# Patient Record
Sex: MALE | Race: WHITE | Employment: FULL TIME | URBAN - METROPOLITAN AREA
[De-identification: names, ages, dates, MRNs, and addresses within clinical notes are randomized per-mention and may not be internally consistent; named-entity substitution may affect disease eponyms.]

---

## 2021-08-08 ENCOUNTER — APPOINTMENT (OUTPATIENT)
Dept: CT IMAGING | Age: 61
End: 2021-08-08
Payer: COMMERCIAL

## 2021-08-08 ENCOUNTER — HOSPITAL ENCOUNTER (EMERGENCY)
Age: 61
Discharge: HOME | End: 2021-08-08
Attending: EMERGENCY MEDICINE
Payer: COMMERCIAL

## 2021-08-08 VITALS
HEART RATE: 70 BPM | WEIGHT: 235 LBS | OXYGEN SATURATION: 99 % | BODY MASS INDEX: 28.62 KG/M2 | SYSTOLIC BLOOD PRESSURE: 152 MMHG | HEIGHT: 76 IN | RESPIRATION RATE: 12 BRPM | TEMPERATURE: 97.8 F | DIASTOLIC BLOOD PRESSURE: 97 MMHG

## 2021-08-08 DIAGNOSIS — K52.9 ENTERITIS: ICD-10-CM

## 2021-08-08 DIAGNOSIS — N20.1 URETEROLITHIASIS: Primary | ICD-10-CM

## 2021-08-08 DIAGNOSIS — N13.2 HYDRONEPHROSIS WITH URINARY OBSTRUCTION DUE TO URETERAL CALCULUS: ICD-10-CM

## 2021-08-08 LAB
-: ABNORMAL
ABSOLUTE EOS #: 0 K/UL (ref 0–0.4)
ABSOLUTE IMMATURE GRANULOCYTE: ABNORMAL K/UL (ref 0–0.3)
ABSOLUTE LYMPH #: 0.8 K/UL (ref 1–4.8)
ABSOLUTE MONO #: 0.9 K/UL (ref 0.1–1.2)
ALBUMIN SERPL-MCNC: 4.4 G/DL (ref 3.5–5.2)
ALBUMIN/GLOBULIN RATIO: 1.7 (ref 1–2.5)
ALP BLD-CCNC: 60 U/L (ref 40–129)
ALT SERPL-CCNC: 26 U/L (ref 5–41)
AMORPHOUS: ABNORMAL
ANION GAP SERPL CALCULATED.3IONS-SCNC: 10 MMOL/L (ref 9–17)
AST SERPL-CCNC: 17 U/L
BACTERIA: ABNORMAL
BASOPHILS # BLD: 0 % (ref 0–2)
BASOPHILS ABSOLUTE: 0 K/UL (ref 0–0.2)
BILIRUB SERPL-MCNC: 0.82 MG/DL (ref 0.3–1.2)
BILIRUBIN URINE: NEGATIVE
BUN BLDV-MCNC: 14 MG/DL (ref 8–23)
BUN/CREAT BLD: ABNORMAL (ref 9–20)
CALCIUM SERPL-MCNC: 9.2 MG/DL (ref 8.6–10.4)
CASTS UA: ABNORMAL /LPF
CHLORIDE BLD-SCNC: 89 MMOL/L (ref 98–107)
CO2: 26 MMOL/L (ref 20–31)
COLOR: YELLOW
COMMENT UA: ABNORMAL
CREAT SERPL-MCNC: 1.04 MG/DL (ref 0.7–1.2)
CRYSTALS, UA: ABNORMAL /HPF
DIFFERENTIAL TYPE: ABNORMAL
EOSINOPHILS RELATIVE PERCENT: 0 % (ref 1–4)
EPITHELIAL CELLS UA: ABNORMAL /HPF (ref 0–5)
GFR AFRICAN AMERICAN: >60 ML/MIN
GFR NON-AFRICAN AMERICAN: >60 ML/MIN
GFR SERPL CREATININE-BSD FRML MDRD: ABNORMAL ML/MIN/{1.73_M2}
GFR SERPL CREATININE-BSD FRML MDRD: ABNORMAL ML/MIN/{1.73_M2}
GLUCOSE BLD-MCNC: 148 MG/DL (ref 70–99)
GLUCOSE URINE: NEGATIVE
HCT VFR BLD CALC: 40.6 % (ref 41–53)
HEMOGLOBIN: 13.6 G/DL (ref 13.5–17.5)
IMMATURE GRANULOCYTES: ABNORMAL %
KETONES, URINE: NEGATIVE
LEUKOCYTE ESTERASE, URINE: ABNORMAL
LIPASE: 18 U/L (ref 13–60)
LYMPHOCYTES # BLD: 8 % (ref 24–44)
MCH RBC QN AUTO: 31 PG (ref 26–34)
MCHC RBC AUTO-ENTMCNC: 33.5 G/DL (ref 31–37)
MCV RBC AUTO: 92.5 FL (ref 80–100)
MONOCYTES # BLD: 9 % (ref 2–11)
MUCUS: ABNORMAL
NITRITE, URINE: NEGATIVE
NRBC AUTOMATED: ABNORMAL PER 100 WBC
OTHER OBSERVATIONS UA: ABNORMAL
PDW BLD-RTO: 12.9 % (ref 12.5–15.4)
PH UA: 6.5 (ref 5–8)
PLATELET # BLD: 193 K/UL (ref 140–450)
PLATELET ESTIMATE: ABNORMAL
PMV BLD AUTO: 8.3 FL (ref 6–12)
POTASSIUM SERPL-SCNC: 3.8 MMOL/L (ref 3.7–5.3)
PROTEIN UA: NEGATIVE
RBC # BLD: 4.39 M/UL (ref 4.5–5.9)
RBC # BLD: ABNORMAL 10*6/UL
RBC UA: ABNORMAL /HPF (ref 0–2)
RENAL EPITHELIAL, UA: ABNORMAL /HPF
SEG NEUTROPHILS: 83 % (ref 36–66)
SEGMENTED NEUTROPHILS ABSOLUTE COUNT: 7.6 K/UL (ref 1.8–7.7)
SODIUM BLD-SCNC: 125 MMOL/L (ref 135–144)
SPECIFIC GRAVITY UA: 1.01 (ref 1–1.03)
TOTAL PROTEIN: 7 G/DL (ref 6.4–8.3)
TRICHOMONAS: ABNORMAL
TURBIDITY: CLEAR
URINE HGB: ABNORMAL
UROBILINOGEN, URINE: NORMAL
WBC # BLD: 9.3 K/UL (ref 3.5–11)
WBC # BLD: ABNORMAL 10*3/UL
WBC UA: ABNORMAL /HPF (ref 0–5)
YEAST: ABNORMAL

## 2021-08-08 PROCEDURE — 6360000002 HC RX W HCPCS: Performed by: PHYSICIAN ASSISTANT

## 2021-08-08 PROCEDURE — 2580000003 HC RX 258: Performed by: PHYSICIAN ASSISTANT

## 2021-08-08 PROCEDURE — 85025 COMPLETE CBC W/AUTO DIFF WBC: CPT

## 2021-08-08 PROCEDURE — 96375 TX/PRO/DX INJ NEW DRUG ADDON: CPT

## 2021-08-08 PROCEDURE — 81001 URINALYSIS AUTO W/SCOPE: CPT

## 2021-08-08 PROCEDURE — 83690 ASSAY OF LIPASE: CPT

## 2021-08-08 PROCEDURE — 36415 COLL VENOUS BLD VENIPUNCTURE: CPT

## 2021-08-08 PROCEDURE — 74177 CT ABD & PELVIS W/CONTRAST: CPT

## 2021-08-08 PROCEDURE — 96374 THER/PROPH/DIAG INJ IV PUSH: CPT

## 2021-08-08 PROCEDURE — 80053 COMPREHEN METABOLIC PANEL: CPT

## 2021-08-08 PROCEDURE — 99284 EMERGENCY DEPT VISIT MOD MDM: CPT

## 2021-08-08 PROCEDURE — 6360000004 HC RX CONTRAST MEDICATION: Performed by: PHYSICIAN ASSISTANT

## 2021-08-08 RX ORDER — 0.9 % SODIUM CHLORIDE 0.9 %
1000 INTRAVENOUS SOLUTION INTRAVENOUS ONCE
Status: COMPLETED | OUTPATIENT
Start: 2021-08-08 | End: 2021-08-08

## 2021-08-08 RX ORDER — LEVOFLOXACIN 750 MG/1
750 TABLET ORAL DAILY
Qty: 5 TABLET | Refills: 0 | Status: SHIPPED | OUTPATIENT
Start: 2021-08-08 | End: 2021-08-08 | Stop reason: SDUPTHER

## 2021-08-08 RX ORDER — SODIUM CHLORIDE 0.9 % (FLUSH) 0.9 %
10 SYRINGE (ML) INJECTION PRN
Status: DISCONTINUED | OUTPATIENT
Start: 2021-08-08 | End: 2021-08-08 | Stop reason: HOSPADM

## 2021-08-08 RX ORDER — LEVOFLOXACIN 750 MG/1
750 TABLET ORAL DAILY
Qty: 5 TABLET | Refills: 0 | Status: SHIPPED | OUTPATIENT
Start: 2021-08-08 | End: 2021-08-08 | Stop reason: CLARIF

## 2021-08-08 RX ORDER — TAMSULOSIN HYDROCHLORIDE 0.4 MG/1
0.4 CAPSULE ORAL DAILY
Qty: 5 CAPSULE | Refills: 0 | Status: ON HOLD | OUTPATIENT
Start: 2021-08-08 | End: 2021-08-12 | Stop reason: SDUPTHER

## 2021-08-08 RX ORDER — ONDANSETRON 4 MG/1
4 TABLET, ORALLY DISINTEGRATING ORAL EVERY 6 HOURS PRN
Qty: 10 TABLET | Refills: 1 | Status: SHIPPED | OUTPATIENT
Start: 2021-08-08 | End: 2021-08-10

## 2021-08-08 RX ORDER — HYDROCODONE BITARTRATE AND ACETAMINOPHEN 5; 325 MG/1; MG/1
1 TABLET ORAL EVERY 6 HOURS PRN
Qty: 10 TABLET | Refills: 0 | Status: SHIPPED | OUTPATIENT
Start: 2021-08-08 | End: 2021-08-09 | Stop reason: SDUPTHER

## 2021-08-08 RX ORDER — 0.9 % SODIUM CHLORIDE 0.9 %
80 INTRAVENOUS SOLUTION INTRAVENOUS ONCE
Status: COMPLETED | OUTPATIENT
Start: 2021-08-08 | End: 2021-08-08

## 2021-08-08 RX ORDER — LEVOFLOXACIN 750 MG/1
750 TABLET ORAL DAILY
Qty: 5 TABLET | Refills: 0 | Status: SHIPPED | OUTPATIENT
Start: 2021-08-08 | End: 2021-08-13

## 2021-08-08 RX ORDER — ONDANSETRON 2 MG/ML
4 INJECTION INTRAMUSCULAR; INTRAVENOUS ONCE
Status: COMPLETED | OUTPATIENT
Start: 2021-08-08 | End: 2021-08-08

## 2021-08-08 RX ORDER — MORPHINE SULFATE 4 MG/ML
4 INJECTION, SOLUTION INTRAMUSCULAR; INTRAVENOUS ONCE
Status: COMPLETED | OUTPATIENT
Start: 2021-08-08 | End: 2021-08-08

## 2021-08-08 RX ADMIN — SODIUM CHLORIDE 1000 ML: 9 INJECTION, SOLUTION INTRAVENOUS at 11:41

## 2021-08-08 RX ADMIN — MORPHINE SULFATE 4 MG: 4 INJECTION INTRAVENOUS at 11:41

## 2021-08-08 RX ADMIN — ONDANSETRON 4 MG: 2 INJECTION INTRAMUSCULAR; INTRAVENOUS at 11:41

## 2021-08-08 RX ADMIN — SODIUM CHLORIDE, PRESERVATIVE FREE 10 ML: 5 INJECTION INTRAVENOUS at 12:03

## 2021-08-08 RX ADMIN — IOPAMIDOL 75 ML: 755 INJECTION, SOLUTION INTRAVENOUS at 12:03

## 2021-08-08 RX ADMIN — SODIUM CHLORIDE 80 ML: 9 INJECTION, SOLUTION INTRAVENOUS at 12:02

## 2021-08-08 ASSESSMENT — PAIN SCALES - GENERAL
PAINLEVEL_OUTOF10: 6
PAINLEVEL_OUTOF10: 5
PAINLEVEL_OUTOF10: 4

## 2021-08-08 ASSESSMENT — PAIN DESCRIPTION - LOCATION
LOCATION: ABDOMEN
LOCATION: FLANK

## 2021-08-08 ASSESSMENT — PAIN DESCRIPTION - PROGRESSION: CLINICAL_PROGRESSION: GRADUALLY IMPROVING

## 2021-08-08 ASSESSMENT — PAIN DESCRIPTION - ORIENTATION: ORIENTATION: MID;LEFT;RIGHT

## 2021-08-08 ASSESSMENT — PAIN DESCRIPTION - DESCRIPTORS: DESCRIPTORS: PRESSURE;ACHING

## 2021-08-08 NOTE — ED NOTES
Patient provided with discharge instructions, prescriptions, and follow up information. Verbalized understanding. IV discontinued and dry dressing in place. A&OX3. Steady gait noted at discharge. Wheelchair declined by patient.       Carleen Simon RN  08/08/21 3216

## 2021-08-08 NOTE — ED PROVIDER NOTES
[address] [facility] ED  [address] [address] [facility] [place] [address]  Phone: [phone]  Fax: [phone] [facility]      Pt Name: Jj Jacome  MRN: [id number]  [place] 1960  Date of evaluation: 8/8/21      CHIEF COMPLAINT:  Chief Complaint   Patient presents with    Abdominal Pain    Constipation       HISTORY OF PRESENT ILLNESS    Jj Jacome is a 62 y.o. male who presents with GI complaints:       Timing/Onset:   3 days  Context/Setting: Patient here with wife for evaluation of persistence of abdominal distention and waves of pressure. Mid to lower abdominal pain that has been going on since Thursday. Patient states he has had very little appetite and has only had 1 bout of vomiting which was on Thursday. Patient has also had no bowel movement since that time either. Patient does feel gassy and looks a little distended. Passing very small amounts of gas he confirms. He is producing urine and drinking pretty well but eating very little. Hx of polypectomies with his previous 3 colonoscopies. Hernia surgeries x 2 with mesh remotely as well. No other urological/abdominal/surgical history reported. No associated chest/resp symptoms. No fever or chills. Quality:    achy  crampy pressure  Duration:  intermittent  Modifying Factors: as above  Severity: moderate        Nursing Notes were reviewed. REVIEW OF SYSTEMS       Constitutional: per HPI  Eyes: No visual changes. Neck: No neck pain. Respiratory: Denies recent shortness of breath. Cardiac:  Denies recent chest pain or palpitations  GI:  Per HPI  : Per HPI    Musculoskeletal: per HPI   Neurologic: Denies headache or focal weakness. Skin:  Denies any rash. Negative in 10 essential Systems except as mentioned above and in the HPI. PAST MEDICAL HISTORY   PMH:  has no past medical history on file.   Surgical History:  has a past surgical history that includes knee surgery and hernia LABS:  Labs Reviewed   CBC WITH AUTO DIFFERENTIAL - Abnormal; Notable for the following components:       Result Value    RBC 4.39 (*)     Hematocrit 40.6 (*)     Seg Neutrophils 83 (*)     Lymphocytes 8 (*)     Eosinophils % 0 (*)     Absolute Lymph # 0.80 (*)     All other components within normal limits   COMPREHENSIVE METABOLIC PANEL W/ REFLEX TO MG FOR LOW K - Abnormal; Notable for the following components:    Glucose 148 (*)     Sodium 125 (*)     Chloride 89 (*)     All other components within normal limits   URINE RT REFLEX TO CULTURE - Abnormal; Notable for the following components:    Urine Hgb LARGE (*)     Leukocyte Esterase, Urine TRACE (*)     All other components within normal limits   MICROSCOPIC URINALYSIS - Abnormal; Notable for the following components:    Bacteria, UA FEW (*)     Other Observations UA   (*)     Value: Utilizing a urinalysis as the only screening method to exclude a potential uropathogen can be unreliable in many patient populations. Rapid screening tests are less sensitive than culture and if UTI is a clinical possibility, culture should be considered despite a negative urinalysis. All other components within normal limits   LIPASE         EMERGENCY DEPARTMENT COURSE, DDX and MDM:     1133  Patient nontoxic and in no pain distress. Full abdominal work-up with CT to rule out obstructive process or any other acute etiology. Morphine and Zofran ordered. 1330   Discussed findings with attending. OK for outpt f/u as labs and clinical findings mild. Pt comfortable going home but I expressed need for close f/u for this large stone. I sent referral via [employer] to [facility] office. I urged them to call them Monday morning for scheduling. Return to ED for worsening symptoms/abd pain/fevers/other concerns. I did provide abx rx. for stranding and mild UTI. I have reviewed the disposition diagnosis with the patient and or their family/guardian.   I have answered their questions and given discharge instructions. They voiced understanding of these instructions and did not have any further questions or complaints. We estimate there is LOW risk for ACUTE APPENDICITIS, BOWEL OBSTRUCTION, CHOLECYSTITIS, DIVERTICULITIS, INCARCERATED HERNIA, PANCREATITIS, or PERFORATED BOWEL or ULCER, thus we consider the discharge disposition reasonable. Re-evaluation of the abdomen is benign. No guarding, peritoneal signs or significant tenderness noted. Also, there is no evidence or peritonitis, sepsis, or toxicity. The patient and/or family and I have discussed the diagnosis and risks, and we agree with discharging home to follow-up with their primary doctor. The patient presents with abdominal pain without signs of peritonitis or other life-threatening or serious etiology. The patient appears stable for discharge and has been instructed to return immediately if the symptoms worsen in any way, or in 8-12 hr if not improved for re-evaluation. We also discussed returning to the Emergency Department immediately if new or worsening symptoms occur. We have discussed the symptoms which are most concerning (e.g., bloody stool, fever, changing or worsening pain, persistent vomiting, chest pain shortness of breath, numbness or weakness to the arms or legs, coolness or color change to the arms or legs) that necessitate immediate return. The patient understands that at this time there is no evidence for a more malignant underlying process, but the patient also understands that early in the process of an illness or injury, an emergency department workup can be falsely reassuring. Routine discharge counseling was given, and the patient understands that worsening, changing or persistent symptoms should prompt an immediate call or follow up with their primary physician or return to the emergency department. The importance of appropriate follow up was also discussed.   We have reviewed the disposition diagnosis with the patient and or their family/guardian. We have answered their questions and given discharge instructions. They voiced understanding of these instructions and did not have any further questions or complaints. Orders Placed This Encounter   Medications    0.9 % sodium chloride bolus    ondansetron (ZOFRAN) injection 4 mg    morphine injection 4 mg    iopamidol (ISOVUE-370) 76 % injection 75 mL    0.9 % sodium chloride bolus    sodium chloride flush 0.9 % injection 10 mL    DISCONTD: levoFLOXacin (LEVAQUIN) 750 MG tablet     Sig: Take 1 tablet by mouth daily for 5 days     Dispense:  5 tablet     Refill:  0    tamsulosin (FLOMAX) 0.4 MG capsule     Sig: Take 1 capsule by mouth daily for 5 doses     Dispense:  5 capsule     Refill:  0    HYDROcodone-acetaminophen (NORCO) 5-325 MG per tablet     Sig: Take 1 tablet by mouth every 6 hours as needed for Pain for up to 3 days. Dispense:  10 tablet     Refill:  0    levoFLOXacin (LEVAQUIN) 750 MG tablet     Sig: Take 1 tablet by mouth daily for 5 days     Dispense:  5 tablet     Refill:  0    ondansetron (ZOFRAN ODT) 4 MG disintegrating tablet     Sig: Take 1 tablet by mouth every 6 hours as needed for Nausea or Vomiting     Dispense:  10 tablet     Refill:  1       CONSULTS:  None      FINAL IMPRESSION      1. Ureterolithiasis    2. Hydronephrosis with urinary obstruction due to ureteral calculus    3. Enteritis          DISPOSITION/PLAN:  DISPOSITION Decision To Discharge 08/08/2021 01:28:19 PM        PATIENT REFERRED TO:  Dhiraj Bingham MD  [facility] [id number]    Schedule an appointment as soon as possible for a visit in 2 days  for re-evaluation of your symptoms    [facility] ED  [address] [facility]   [place] [address] [phone]  Go to   for worsening of symptoms, inability to stay hydrated with vomiting/diarrhea, increasing abdominal pain, fevers, vomiting      DISCHARGE MEDICATIONS:  New Prescriptions    HYDROCODONE-ACETAMINOPHEN (NORCO) 5-325 MG PER TABLET    Take 1 tablet by mouth every 6 hours as needed for Pain for up to 3 days.     LEVOFLOXACIN (LEVAQUIN) 750 MG TABLET    Take 1 tablet by mouth daily for 5 days    ONDANSETRON (ZOFRAN ODT) 4 MG DISINTEGRATING TABLET    Take 1 tablet by mouth every 6 hours as needed for Nausea or Vomiting    TAMSULOSIN (FLOMAX) 0.4 MG CAPSULE    Take 1 capsule by mouth daily for 5 doses       (Please note that portions of this note were completed with a voice recognition program.  Efforts were made to edit the dictations but occasionally words are mis-transcribed.)    ROSETTE Arevalo PA-C  08/08/21 1881

## 2021-08-08 NOTE — ED TRIAGE NOTES
Started on Thursday, had right flank pain and cramping abdominal pain, 1 episode of vomiting, no BM since Thursday. States that pain comes in waves, being in hot tub helps, states not eating much, drinking water, gatorade, and ate scrambled eggs this morning. Denies hx of kidney stones. Hx of polyps with colonoscopy.

## 2021-08-08 NOTE — ED PROVIDER NOTES
[address] [facility] ED    [address] [facility] [address] [facility] [address]  Phone: [phone]  Fax: [phone]  Emergency Department  Faculty Attestation    I performed a history and physical examination of the patient and discussed management with the mid level provideer. I reviewed the mid level provider's note and agree with the documented findings and plan of care. Any areas of disagreement are noted on the chart. I was personally present for the key portions of any procedures. I have documented in the chart those procedures where I was not present during the key portions. I have reviewed the emergency nurses triage note. I agree with the chief complaint, past medical history, past surgical history, allergies, medications, social and family history as documented unless otherwise noted below. Documentation of the HPI, Physical Exam and Medical Decision Making performed by medical students or scribes is based on my personal performance of the HPI, PE and MDM. For Physician Assistant/ Nurse Practitioner cases/documentation I have personally evaluated this patient and have completed at least one if not all key elements of the E/M (history, physical exam, and MDM). Additional findings are as noted.       Primary Care Physician:  [facility]    CHIEF COMPLAINT       Chief Complaint   Patient presents with    Abdominal Pain    Constipation       RECENT VITALS:   Temp: 97.8 °F (36.6 °C),  Pulse: 70, Resp: 12, BP: (!) 152/97    LABS:  Labs Reviewed   CBC WITH AUTO DIFFERENTIAL - Abnormal; Notable for the following components:       Result Value    RBC 4.39 (*)     Hematocrit 40.6 (*)     Seg Neutrophils 83 (*)     Lymphocytes 8 (*)     Eosinophils % 0 (*)     Absolute Lymph # 0.80 (*)     All other components within normal limits   COMPREHENSIVE METABOLIC PANEL W/ REFLEX TO MG FOR LOW K - Abnormal; Notable for the following components:    Glucose 148 (*)     Sodium 125 (*)     Chloride 89 (*)     All other components within normal limits   URINE RT REFLEX TO CULTURE - Abnormal; Notable for the following components:    Urine Hgb LARGE (*)     Leukocyte Esterase, Urine TRACE (*)     All other components within normal limits   MICROSCOPIC URINALYSIS - Abnormal; Notable for the following components:    Bacteria, UA FEW (*)     Other Observations UA   (*)     Value: Utilizing a urinalysis as the only screening method to exclude a potential uropathogen can be unreliable in many patient populations. Rapid screening tests are less sensitive than culture and if UTI is a clinical possibility, culture should be considered despite a negative urinalysis. All other components within normal limits   LIPASE          CT ABDOMEN PELVIS W IV CONTRAST Additional Contrast? None (Preliminary result)  Result time 08/08/21 13:02:46  Preliminary result by Uriah Ochoa MD (08/08/21 13:02:46)                Impression:    1.  Moderate right hydronephrosis and dilatation of the proximal right ureter   with periureteral stranding and decreased medullary enhancement consistent   with moderately high-grade obstruction due to a calculus of 7.5 x 6.1 cm in   the L2 ureter on the right side. 2.  Severe sigmoid diverticulosis without acute diverticulitis. 3.  Increased fluid in the ascending colon and distal small bowel which may   be related to low-grade enteritis or secondary to the obstruction in the   right kidney. Narrative:    EXAMINATION:   CT OF THE ABDOMEN AND PELVIS WITH CONTRAST 8/8/2021 11:48 am     TECHNIQUE:   CT of the abdomen and pelvis was performed with the administration of   intravenous contrast. Multiplanar reformatted images are provided for review. Dose modulation, iterative reconstruction, and/or weight based adjustment of   the mA/kV was utilized to reduce the radiation dose to as low as reasonably   achievable.      COMPARISON:   None     HISTORY:   ORDERING SYSTEM PROVIDED HISTORY: mid/lower abd pain/distention   TECHNOLOGIST PROVIDED HISTORY:     mid/lower abd pain/distention   Decision Support Exception - unselect if not a suspected or confirmed   emergency medical condition->Emergency Medical Condition (MA)   Reason for Exam: abdominal pain, distention x 4 days   Acuity: Acute   Type of Exam: Initial   Additional signs and symptoms: sts one episode of emesis, constipation x4   days as well. Decreased appetite,   Relevant Medical/Surgical History: hernia repair with mesh x 2     FINDINGS:   Lower Chest: No acute abnormality in the included lung bases. Organs: Liver, spleen, gallbladder, pancreas, adrenals and left kidney are   unremarkable and demonstrate no acute abnormality.  The right kidney is   enlarged with decreased medullary enhancement compared to the right kidney as   well as moderate right hydronephrosis and proximal right hydroureter   secondary to obstructing calculus right L2 ureter 7.5 x 6.1 mm with   periureteral stranding consistent with moderately high-grade obstruction. GI/Bowel: No free fluid, free air, or small bowel obstruction is noted. Moderately severe sigmoid diverticulosis without acute diverticulitis is   noted.  Diffusely increased distal small bowel and proximal colonic fluid is   noted. Pelvis: No evidence of appendicitis.  Bladder is unremarkable.  Prostate is   mildly enlarged.  No free pelvic fluid, pelvic or inguinal adenopathy is   noted. Peritoneum/Retroperitoneum: No aortic aneurysm, retroperitoneal or mesenteric   adenopathy is noted.  Some shotty periaortic lymph nodes are demonstrated. Bones/Soft Tissues: No acute osseous or subcutaneous soft tissue abnormality.    Multilevel degenerative changes.                 Preliminary result by Leonila Wang MD (08/08/21 12:27:38)                Impression:    1.  Moderate right hydronephrosis and dilatation of the proximal right ureter   with Jennifer ureteral stranding in decreased medullary enhancement

## 2021-08-09 ENCOUNTER — TELEPHONE (OUTPATIENT)
Dept: UROLOGY | Age: 61
End: 2021-08-09

## 2021-08-09 ENCOUNTER — OFFICE VISIT (OUTPATIENT)
Dept: UROLOGY | Age: 61
End: 2021-08-09
Payer: COMMERCIAL

## 2021-08-09 VITALS
WEIGHT: 235 LBS | HEIGHT: 76 IN | BODY MASS INDEX: 28.62 KG/M2 | SYSTOLIC BLOOD PRESSURE: 124 MMHG | HEART RATE: 80 BPM | DIASTOLIC BLOOD PRESSURE: 78 MMHG | TEMPERATURE: 97.4 F

## 2021-08-09 DIAGNOSIS — R10.9 RIGHT FLANK PAIN: ICD-10-CM

## 2021-08-09 DIAGNOSIS — N20.1 URETEROLITHIASIS: ICD-10-CM

## 2021-08-09 DIAGNOSIS — N13.2 HYDRONEPHROSIS WITH URINARY OBSTRUCTION DUE TO URETERAL CALCULUS: ICD-10-CM

## 2021-08-09 DIAGNOSIS — N20.1 RIGHT URETERAL STONE: Primary | ICD-10-CM

## 2021-08-09 PROCEDURE — G8428 CUR MEDS NOT DOCUMENT: HCPCS | Performed by: UROLOGY

## 2021-08-09 PROCEDURE — 1036F TOBACCO NON-USER: CPT | Performed by: UROLOGY

## 2021-08-09 PROCEDURE — G8419 CALC BMI OUT NRM PARAM NOF/U: HCPCS | Performed by: UROLOGY

## 2021-08-09 PROCEDURE — 3017F COLORECTAL CA SCREEN DOC REV: CPT | Performed by: UROLOGY

## 2021-08-09 PROCEDURE — 99204 OFFICE O/P NEW MOD 45 MIN: CPT | Performed by: UROLOGY

## 2021-08-09 RX ORDER — HYDROCODONE BITARTRATE AND ACETAMINOPHEN 5; 325 MG/1; MG/1
1 TABLET ORAL EVERY 6 HOURS PRN
Qty: 20 TABLET | Refills: 0 | Status: SHIPPED | OUTPATIENT
Start: 2021-08-09 | End: 2021-08-14

## 2021-08-09 ASSESSMENT — ENCOUNTER SYMPTOMS
CONSTIPATION: 1
EYE PAIN: 0
VOMITING: 1
ABDOMINAL PAIN: 1
COLOR CHANGE: 0
COUGH: 0
BACK PAIN: 1
SHORTNESS OF BREATH: 0
WHEEZING: 0
EYE REDNESS: 0
NAUSEA: 1

## 2021-08-09 NOTE — PROGRESS NOTES
..Review of Systems   Constitutional: Negative for appetite change, chills and fever. Eyes: Negative for pain, redness and visual disturbance. Respiratory: Negative for cough, shortness of breath and wheezing. Cardiovascular: Negative for chest pain and leg swelling. Gastrointestinal: Positive for abdominal pain, constipation, nausea and vomiting. Genitourinary: Positive for flank pain. Negative for difficulty urinating, dysuria, frequency, hematuria, testicular pain and urgency. Musculoskeletal: Positive for back pain. Negative for joint swelling and myalgias. Skin: Negative for color change, rash and wound. Neurological: Negative for dizziness, tremors, weakness, numbness and headaches. Hematological: Negative for adenopathy. Does not bruise/bleed easily.

## 2021-08-09 NOTE — PROGRESS NOTES
[address] [address]  Dept: [phone]  Dept Fax: [address] [facility] Urology Office Note - New patient    Patient:  Asim Waterman  YOB: 1960  Date: 8/9/2021    The patient is a 62 y.o. male who presents todayfor evaluation of the following problems:   Chief Complaint   Patient presents with    New Patient    referred by Pratima Vinson. \[facility]\""  Henny Back is a very pleasant 29-year-old gentleman who was recently in the emergency department. He was found to have a 6 mm right ureteral stone. He has had continued severe pain on and off but is tolerating it with Norco.  Of note, he is planning to go to [place] in about 3 weeks. (Patient's old records have been requested, reviewed and summarized in today's note.)    Summary of old records: N/A    Additional History: N/A    Procedures Today: N/A    Last several PSA's:  No results found for: PSA  Last total testosterone:  No results found for: TESTOSTERONE  Urinalysis today:  No results found for this visit on 08/09/21. AUA Symptom Score (8/9/2021):   INCOMPLETE EMPTYING: How often have you had the sensation of not emptying your bladder?: Not at all  FREQUENCY: How often do you have to urinate less than every two hours?: Less than Half the time  INTERMITTENCY: How often have you found you stopped and started again several times when you urinated?: Not at all  URGENCY: How often have you found it difficult to postpone urination?: Less than 1 to 5 times  WEAK STREAM: How often have you had a weak urinary stream?: Less than 1 to 5 times  STRAINING: How often have you had to strain to start  urination?: Not at all  NOCTURIA: How many times did you typically get up at night to uriniate?: 2 Times  TOTAL I-PSS SCORE[de-identified] 6  How would you feel if you were to spend the rest of your life with your urinary condition?: Unhappy    Last BUN and creatinine:  Lab Results Component Value Date    BUN 14 08/08/2021     Lab Results   Component Value Date    CREATININE 1.04 08/08/2021       Additional Lab/Culture results: none    Imaging Reviewed during this Office Visit: none  (results were independently reviewed by physician and radiology report verified)    PAST MEDICAL, FAMILY AND SOCIAL HISTORY:  No past medical history on file. Past Surgical History:   Procedure Laterality Date    HERNIA REPAIR      KNEE SURGERY       No family history on file. Outpatient Medications Marked as Taking for the 8/9/21 encounter (Office Visit) with Michael Rodríguez MD   Medication Sig Dispense Refill    HYDROcodone-acetaminophen (NORCO) 5-325 MG per tablet Take 1 tablet by mouth every 6 hours as needed for Pain for up to 5 days. 20 tablet 0    tamsulosin (FLOMAX) 0.4 MG capsule Take 1 capsule by mouth daily for 5 doses 5 capsule 0    levoFLOXacin (LEVAQUIN) 750 MG tablet Take 1 tablet by mouth daily for 5 days 5 tablet 0        Oxycodone-acetaminophen  Social History     Tobacco Use   Smoking Status Never Smoker   Smokeless Tobacco Never Used      (If patient a smoker, smoking cessation counseling offered)   Social History     Substance and Sexual Activity   Alcohol Use Yes       REVIEW OF SYSTEMS:  Review of Systems    Physical Exam:    This a 62 y.o. male   Vitals:    08/09/21 1443   BP: 124/78   Pulse: 80   Temp: 97.4 °F (36.3 °C)     Body mass index is 28.61 kg/m². Physical Exam  Constitutional: Patient in no acute distress. Neuro: Alert and oriented to person, place and time.   Psych: Mood normal, affect normal  Skin: No rash noted  HEENT: Head: Normocephalic and atraumatic  Conjunctivae and EOM are normal. Pupils are equal, round  Nose: Normal  Right External Ear: Normal; Left External Ear: Normal  Mouth: Mucosa Moist  Neck: Supple  Lungs:Respiratory effort is normal  Cardiovascular: Warm & Pink  Abdomen: Soft, non-tender, non-distendedwith no CVA,  No flank tenderness, Orhepatosplenomegaly   Lymphatics: No palpable lymphadenopathy. Bladder non-tender and not distended. Musculoskeletal: Normal gait and station          Assessment and Plan      1. Right ureteral stone    2. Right flank pain    3. Ureterolithiasis    4. Hydronephrosis with urinary obstruction due to ureteral calculus           Plan:  Cysto right urs hll stent st v's later this week       Prescriptions Ordered:  Orders Placed This Encounter   Medications    HYDROcodone-acetaminophen (NORCO) 5-325 MG per tablet     Sig: Take 1 tablet by mouth every 6 hours as needed for Pain for up to 5 days. Dispense:  20 tablet     Refill:  0     Reduce doses taken as pain becomes manageable      Orders Placed:  No orders of the defined types were placed in this encounter. Zaida Lorenzo MD    Agree with the ROS entered by the MA.

## 2021-08-10 ENCOUNTER — TELEPHONE (OUTPATIENT)
Dept: UROLOGY | Age: 61
End: 2021-08-10

## 2021-08-10 RX ORDER — IBUPROFEN 600 MG/1
600 TABLET ORAL EVERY 6 HOURS PRN
COMMUNITY

## 2021-08-10 NOTE — TELEPHONE ENCOUNTER
Pt called asking \" can I take 2 norco, I was in severe pain last night I almost went to the ER. \"  Per Lourdes Doll CNP pt can take another Norco if pain is severe. No driving while taking pain medication. Pt had further questions call transfer to [facility].

## 2021-08-12 ENCOUNTER — ANESTHESIA (OUTPATIENT)
Dept: OPERATING ROOM | Age: 61
End: 2021-08-12
Payer: COMMERCIAL

## 2021-08-12 ENCOUNTER — ANESTHESIA EVENT (OUTPATIENT)
Dept: OPERATING ROOM | Age: 61
End: 2021-08-12
Payer: COMMERCIAL

## 2021-08-12 ENCOUNTER — HOSPITAL ENCOUNTER (OUTPATIENT)
Age: 61
Setting detail: OUTPATIENT SURGERY
Discharge: HOME | End: 2021-08-12
Attending: UROLOGY | Admitting: UROLOGY
Payer: COMMERCIAL

## 2021-08-12 ENCOUNTER — APPOINTMENT (OUTPATIENT)
Dept: GENERAL RADIOLOGY | Age: 61
End: 2021-08-12
Attending: UROLOGY
Payer: COMMERCIAL

## 2021-08-12 VITALS
DIASTOLIC BLOOD PRESSURE: 100 MMHG | HEART RATE: 72 BPM | TEMPERATURE: 98 F | HEIGHT: 76 IN | BODY MASS INDEX: 28.62 KG/M2 | WEIGHT: 235 LBS | OXYGEN SATURATION: 98 % | SYSTOLIC BLOOD PRESSURE: 159 MMHG | RESPIRATION RATE: 16 BRPM

## 2021-08-12 VITALS
TEMPERATURE: 95.8 F | SYSTOLIC BLOOD PRESSURE: 118 MMHG | RESPIRATION RATE: 11 BRPM | OXYGEN SATURATION: 100 % | DIASTOLIC BLOOD PRESSURE: 80 MMHG

## 2021-08-12 LAB
EKG ATRIAL RATE: 67 BPM
EKG P AXIS: 35 DEGREES
EKG P-R INTERVAL: 172 MS
EKG Q-T INTERVAL: 406 MS
EKG QRS DURATION: 108 MS
EKG QTC CALCULATION (BAZETT): 429 MS
EKG R AXIS: 65 DEGREES
EKG T AXIS: 67 DEGREES
EKG VENTRICULAR RATE: 67 BPM

## 2021-08-12 PROCEDURE — 7100000010 HC PHASE II RECOVERY - FIRST 15 MIN: Performed by: UROLOGY

## 2021-08-12 PROCEDURE — 6360000002 HC RX W HCPCS: Performed by: NURSE ANESTHETIST, CERTIFIED REGISTERED

## 2021-08-12 PROCEDURE — 2580000003 HC RX 258: Performed by: ANESTHESIOLOGY

## 2021-08-12 PROCEDURE — 3700000001 HC ADD 15 MINUTES (ANESTHESIA): Performed by: UROLOGY

## 2021-08-12 PROCEDURE — 7100000001 HC PACU RECOVERY - ADDTL 15 MIN: Performed by: UROLOGY

## 2021-08-12 PROCEDURE — C1769 GUIDE WIRE: HCPCS | Performed by: UROLOGY

## 2021-08-12 PROCEDURE — 2720000010 HC SURG SUPPLY STERILE: Performed by: UROLOGY

## 2021-08-12 PROCEDURE — 3600000004 HC SURGERY LEVEL 4 BASE: Performed by: UROLOGY

## 2021-08-12 PROCEDURE — C1758 CATHETER, URETERAL: HCPCS | Performed by: UROLOGY

## 2021-08-12 PROCEDURE — 2709999900 HC NON-CHARGEABLE SUPPLY: Performed by: UROLOGY

## 2021-08-12 PROCEDURE — C2617 STENT, NON-COR, TEM W/O DEL: HCPCS | Performed by: UROLOGY

## 2021-08-12 PROCEDURE — 2500000003 HC RX 250 WO HCPCS: Performed by: NURSE ANESTHETIST, CERTIFIED REGISTERED

## 2021-08-12 PROCEDURE — 7100000000 HC PACU RECOVERY - FIRST 15 MIN: Performed by: UROLOGY

## 2021-08-12 PROCEDURE — 3600000014 HC SURGERY LEVEL 4 ADDTL 15MIN: Performed by: UROLOGY

## 2021-08-12 PROCEDURE — 3700000000 HC ANESTHESIA ATTENDED CARE: Performed by: UROLOGY

## 2021-08-12 PROCEDURE — 7100000011 HC PHASE II RECOVERY - ADDTL 15 MIN: Performed by: UROLOGY

## 2021-08-12 PROCEDURE — 6360000002 HC RX W HCPCS: Performed by: STUDENT IN AN ORGANIZED HEALTH CARE EDUCATION/TRAINING PROGRAM

## 2021-08-12 PROCEDURE — 93005 ELECTROCARDIOGRAM TRACING: CPT | Performed by: ANESTHESIOLOGY

## 2021-08-12 PROCEDURE — 2580000003 HC RX 258: Performed by: UROLOGY

## 2021-08-12 PROCEDURE — 3209999900 FLUORO FOR SURGICAL PROCEDURES

## 2021-08-12 DEVICE — URETERAL STENT
Type: IMPLANTABLE DEVICE | Status: FUNCTIONAL
Brand: POLARIS™ ULTRA

## 2021-08-12 RX ORDER — DEXAMETHASONE SODIUM PHOSPHATE 10 MG/ML
INJECTION INTRAMUSCULAR; INTRAVENOUS PRN
Status: DISCONTINUED | OUTPATIENT
Start: 2021-08-12 | End: 2021-08-12 | Stop reason: SDUPTHER

## 2021-08-12 RX ORDER — SODIUM CHLORIDE, SODIUM LACTATE, POTASSIUM CHLORIDE, CALCIUM CHLORIDE 600; 310; 30; 20 MG/100ML; MG/100ML; MG/100ML; MG/100ML
INJECTION, SOLUTION INTRAVENOUS CONTINUOUS
Status: DISCONTINUED | OUTPATIENT
Start: 2021-08-12 | End: 2021-08-12 | Stop reason: HOSPADM

## 2021-08-12 RX ORDER — OXYCODONE HYDROCHLORIDE AND ACETAMINOPHEN 5; 325 MG/1; MG/1
1 TABLET ORAL EVERY 4 HOURS PRN
Status: DISCONTINUED | OUTPATIENT
Start: 2021-08-12 | End: 2021-08-12 | Stop reason: HOSPADM

## 2021-08-12 RX ORDER — ONDANSETRON 2 MG/ML
4 INJECTION INTRAMUSCULAR; INTRAVENOUS DAILY PRN
Status: DISCONTINUED | OUTPATIENT
Start: 2021-08-12 | End: 2021-08-12 | Stop reason: HOSPADM

## 2021-08-12 RX ORDER — MAGNESIUM HYDROXIDE 1200 MG/15ML
LIQUID ORAL CONTINUOUS PRN
Status: COMPLETED | OUTPATIENT
Start: 2021-08-12 | End: 2021-08-12

## 2021-08-12 RX ORDER — LABETALOL HYDROCHLORIDE 5 MG/ML
5 INJECTION, SOLUTION INTRAVENOUS EVERY 10 MIN PRN
Status: DISCONTINUED | OUTPATIENT
Start: 2021-08-12 | End: 2021-08-12 | Stop reason: HOSPADM

## 2021-08-12 RX ORDER — MIDAZOLAM HYDROCHLORIDE 2 MG/2ML
1 INJECTION, SOLUTION INTRAMUSCULAR; INTRAVENOUS EVERY 10 MIN PRN
Status: DISCONTINUED | OUTPATIENT
Start: 2021-08-12 | End: 2021-08-12 | Stop reason: HOSPADM

## 2021-08-12 RX ORDER — HYDRALAZINE HYDROCHLORIDE 20 MG/ML
5 INJECTION INTRAMUSCULAR; INTRAVENOUS EVERY 10 MIN PRN
Status: DISCONTINUED | OUTPATIENT
Start: 2021-08-12 | End: 2021-08-12 | Stop reason: HOSPADM

## 2021-08-12 RX ORDER — FENTANYL CITRATE 50 UG/ML
50 INJECTION, SOLUTION INTRAMUSCULAR; INTRAVENOUS EVERY 5 MIN PRN
Status: DISCONTINUED | OUTPATIENT
Start: 2021-08-12 | End: 2021-08-12 | Stop reason: HOSPADM

## 2021-08-12 RX ORDER — ONDANSETRON 2 MG/ML
INJECTION INTRAMUSCULAR; INTRAVENOUS PRN
Status: DISCONTINUED | OUTPATIENT
Start: 2021-08-12 | End: 2021-08-12 | Stop reason: SDUPTHER

## 2021-08-12 RX ORDER — FENTANYL CITRATE 50 UG/ML
INJECTION, SOLUTION INTRAMUSCULAR; INTRAVENOUS PRN
Status: DISCONTINUED | OUTPATIENT
Start: 2021-08-12 | End: 2021-08-12 | Stop reason: SDUPTHER

## 2021-08-12 RX ORDER — PROPOFOL 10 MG/ML
INJECTION, EMULSION INTRAVENOUS PRN
Status: DISCONTINUED | OUTPATIENT
Start: 2021-08-12 | End: 2021-08-12 | Stop reason: SDUPTHER

## 2021-08-12 RX ORDER — LIDOCAINE HYDROCHLORIDE 10 MG/ML
INJECTION, SOLUTION EPIDURAL; INFILTRATION; INTRACAUDAL; PERINEURAL PRN
Status: DISCONTINUED | OUTPATIENT
Start: 2021-08-12 | End: 2021-08-12 | Stop reason: SDUPTHER

## 2021-08-12 RX ORDER — MIDAZOLAM HYDROCHLORIDE 1 MG/ML
INJECTION INTRAMUSCULAR; INTRAVENOUS PRN
Status: DISCONTINUED | OUTPATIENT
Start: 2021-08-12 | End: 2021-08-12 | Stop reason: SDUPTHER

## 2021-08-12 RX ORDER — FENTANYL CITRATE 50 UG/ML
25 INJECTION, SOLUTION INTRAMUSCULAR; INTRAVENOUS EVERY 5 MIN PRN
Status: DISCONTINUED | OUTPATIENT
Start: 2021-08-12 | End: 2021-08-12 | Stop reason: HOSPADM

## 2021-08-12 RX ORDER — TAMSULOSIN HYDROCHLORIDE 0.4 MG/1
0.4 CAPSULE ORAL DAILY
Qty: 5 CAPSULE | Refills: 0 | Status: SHIPPED | OUTPATIENT
Start: 2021-08-12 | End: 2021-08-17

## 2021-08-12 RX ORDER — DIPHENHYDRAMINE HYDROCHLORIDE 50 MG/ML
12.5 INJECTION INTRAMUSCULAR; INTRAVENOUS
Status: DISCONTINUED | OUTPATIENT
Start: 2021-08-12 | End: 2021-08-12 | Stop reason: HOSPADM

## 2021-08-12 RX ORDER — PROMETHAZINE HYDROCHLORIDE 25 MG/ML
6.25 INJECTION, SOLUTION INTRAMUSCULAR; INTRAVENOUS
Status: DISCONTINUED | OUTPATIENT
Start: 2021-08-12 | End: 2021-08-12 | Stop reason: HOSPADM

## 2021-08-12 RX ORDER — 0.9 % SODIUM CHLORIDE 0.9 %
500 INTRAVENOUS SOLUTION INTRAVENOUS
Status: DISCONTINUED | OUTPATIENT
Start: 2021-08-12 | End: 2021-08-12 | Stop reason: HOSPADM

## 2021-08-12 RX ORDER — ONDANSETRON 2 MG/ML
4 INJECTION INTRAMUSCULAR; INTRAVENOUS
Status: DISCONTINUED | OUTPATIENT
Start: 2021-08-12 | End: 2021-08-12 | Stop reason: HOSPADM

## 2021-08-12 RX ORDER — SCOLOPAMINE TRANSDERMAL SYSTEM 1 MG/1
1 PATCH, EXTENDED RELEASE TRANSDERMAL ONCE
Status: DISCONTINUED | OUTPATIENT
Start: 2021-08-12 | End: 2021-08-12 | Stop reason: HOSPADM

## 2021-08-12 RX ORDER — OXYBUTYNIN CHLORIDE 5 MG/1
5 TABLET, EXTENDED RELEASE ORAL DAILY
Qty: 5 TABLET | Refills: 0 | Status: SHIPPED | OUTPATIENT
Start: 2021-08-12

## 2021-08-12 RX ORDER — LIDOCAINE HYDROCHLORIDE 10 MG/ML
1 INJECTION, SOLUTION EPIDURAL; INFILTRATION; INTRACAUDAL; PERINEURAL
Status: DISCONTINUED | OUTPATIENT
Start: 2021-08-12 | End: 2021-08-12 | Stop reason: HOSPADM

## 2021-08-12 RX ORDER — CEFADROXIL 500 MG/1
500 CAPSULE ORAL 2 TIMES DAILY
Qty: 6 CAPSULE | Refills: 0 | Status: SHIPPED | OUTPATIENT
Start: 2021-08-12 | End: 2021-08-15

## 2021-08-12 RX ADMIN — MIDAZOLAM HYDROCHLORIDE 2 MG: 1 INJECTION, SOLUTION INTRAMUSCULAR; INTRAVENOUS at 07:46

## 2021-08-12 RX ADMIN — SODIUM CHLORIDE, POTASSIUM CHLORIDE, SODIUM LACTATE AND CALCIUM CHLORIDE: 600; 310; 30; 20 INJECTION, SOLUTION INTRAVENOUS at 07:21

## 2021-08-12 RX ADMIN — CEFAZOLIN SODIUM 2000 MG: 10 INJECTION, POWDER, FOR SOLUTION INTRAVENOUS at 07:56

## 2021-08-12 RX ADMIN — FENTANYL CITRATE 25 MCG: 50 INJECTION, SOLUTION INTRAMUSCULAR; INTRAVENOUS at 08:04

## 2021-08-12 RX ADMIN — LIDOCAINE HYDROCHLORIDE 50 MG: 10 INJECTION, SOLUTION EPIDURAL; INFILTRATION; INTRACAUDAL; PERINEURAL at 07:50

## 2021-08-12 RX ADMIN — DEXAMETHASONE SODIUM PHOSPHATE 10 MG: 10 INJECTION INTRAMUSCULAR; INTRAVENOUS at 08:02

## 2021-08-12 RX ADMIN — FENTANYL CITRATE 25 MCG: 50 INJECTION, SOLUTION INTRAMUSCULAR; INTRAVENOUS at 08:14

## 2021-08-12 RX ADMIN — ONDANSETRON 4 MG: 2 INJECTION, SOLUTION INTRAMUSCULAR; INTRAVENOUS at 08:22

## 2021-08-12 RX ADMIN — FENTANYL CITRATE 25 MCG: 50 INJECTION, SOLUTION INTRAMUSCULAR; INTRAVENOUS at 08:12

## 2021-08-12 RX ADMIN — FENTANYL CITRATE 25 MCG: 50 INJECTION, SOLUTION INTRAMUSCULAR; INTRAVENOUS at 07:57

## 2021-08-12 RX ADMIN — PROPOFOL INJECTABLE EMULSION 200 MG: 10 INJECTION, EMULSION INTRAVENOUS at 07:50

## 2021-08-12 ASSESSMENT — PULMONARY FUNCTION TESTS
PIF_VALUE: 4
PIF_VALUE: 1
PIF_VALUE: 13
PIF_VALUE: 4
PIF_VALUE: 12
PIF_VALUE: 10
PIF_VALUE: 13
PIF_VALUE: 12
PIF_VALUE: 13
PIF_VALUE: 12
PIF_VALUE: 10
PIF_VALUE: 4
PIF_VALUE: 12
PIF_VALUE: 13
PIF_VALUE: 3
PIF_VALUE: 13
PIF_VALUE: 10
PIF_VALUE: 2
PIF_VALUE: 9
PIF_VALUE: 3
PIF_VALUE: 13
PIF_VALUE: 1
PIF_VALUE: 12
PIF_VALUE: 0
PIF_VALUE: 10
PIF_VALUE: 5
PIF_VALUE: 0
PIF_VALUE: 1
PIF_VALUE: 1
PIF_VALUE: 5
PIF_VALUE: 12
PIF_VALUE: 0
PIF_VALUE: 10
PIF_VALUE: 9
PIF_VALUE: 12
PIF_VALUE: 2
PIF_VALUE: 13
PIF_VALUE: 10
PIF_VALUE: 12
PIF_VALUE: 10
PIF_VALUE: 11
PIF_VALUE: 13
PIF_VALUE: 12
PIF_VALUE: 19
PIF_VALUE: 9
PIF_VALUE: 11
PIF_VALUE: 10
PIF_VALUE: 12
PIF_VALUE: 2

## 2021-08-12 ASSESSMENT — PAIN DESCRIPTION - LOCATION: LOCATION: PERINEUM;PENIS

## 2021-08-12 ASSESSMENT — PAIN SCALES - GENERAL
PAINLEVEL_OUTOF10: 1

## 2021-08-12 ASSESSMENT — PAIN - FUNCTIONAL ASSESSMENT: PAIN_FUNCTIONAL_ASSESSMENT: 0-10

## 2021-08-12 ASSESSMENT — PAIN DESCRIPTION - PAIN TYPE: TYPE: SURGICAL PAIN

## 2021-08-12 NOTE — ANESTHESIA POSTPROCEDURE EVALUATION
Department of Anesthesiology  Postprocedure Note    Patient: Marty Nunez  MRN: [id number]  YOB: 1960  Date of evaluation: 8/12/2021  Time:  9:28 AM     Procedure Summary     Date: 08/12/21 Room / Location: [address]    Anesthesia Start: 5868 Anesthesia Stop: 8732    Procedure: HOLMIUM- CYSTO, URETEROSCOPY, LASER LITHO, STENT PLACEMENT (Right ) Diagnosis: (RIGHT URETERAL STONE)    Surgeons: Serena Farris MD Responsible Provider: Alissa Gaviria MD    Anesthesia Type: general ASA Status: 1          Anesthesia Type: general    Loren Phase I: Loren Score: 10    Loren Phase II:      Last vitals: Reviewed and per EMR flowsheets.        Anesthesia Post Evaluation    Patient location during evaluation: PACU  Patient participation: complete - patient participated  Level of consciousness: awake  Pain score: 1  Airway patency: patent  Nausea & Vomiting: no nausea and no vomiting  Complications: no  Cardiovascular status: blood pressure returned to baseline and hemodynamically stable  Respiratory status: acceptable  Hydration status: euvolemic

## 2021-08-12 NOTE — OP NOTE
Operative Note      Patient: Simone Dyson  YOB: 1960  MRN: [id number]    Date of Procedure: 8/12/2021    Pre-Op Diagnosis: RIGHT URETERAL STONE    Post-Op Diagnosis: Same       Procedure(s):  HOLMIUM- CYSTO, URETEROSCOPY, LASER LITHO, STENT PLACEMENT    Surgeon(s):  Ronit Quiroga MD    Assistant:   Mac Torres MD    Anesthesia: General    Estimated Blood Loss (mL): Minimal    Complications: None    Specimens:   * No specimens in log *    Implants:  Implant Name Type Inv. Item Serial No.  Lot No. LRB No. Used Action   STENT URET 6FR L26CM PERCFLX HYDR+ DBL PGTL THRD 2  STENT URET 6FR L26CM PERCFLX HYDR+ DBL PGTL THRD 2  [employer] [facility] UROLOG[facility]- [id number] Right 1 Implanted         Drains: * No LDAs found *    Findings:   1. Right proximal ureteral stone    Indications:  Patient is a 62year old male who presents with right flank pain secondary to 6mm right ureteral stone causing right hydronephrosis. He presents for cystoscopy and right ureteroscopy with holmium laser lithotripsy. Risks and benefits were explained to the patient, and he elected to proceed. Informed consent was obtained. Detailed Description of Procedure:   After informed consent was obtained in the preoperative area, the patient was taken back to the operating room and transferred to the operating table in supine position. EPC cuffs were placed. The machine was turned on. Anesthesia was induced and antibiotics were given. The patient was placed in modified dorsal lithotomy position and sterilely prepped and draped in a standard fashion. A timeout occurred. Two patient identifiers were used. We entered the urethra with a 22 [employer] Yolette scope with 30 degree lens. The right ureteral orifice was visualized and carefully cannulated with a straight 0.035 Glidewire. This was advanced into the kidney with fluoroscopic guidance.  A dual lumen ureteral catheter was then used to place a second 0.035 Glidewire into the kidney, also using fluoroscopic guidance. The flexible ureteroscope was assembled, place over the Glidewire, and advanced into the kidney carefully under fluoro. The second wire remained in place as a safety. Pan-nephroscopy was completed. The stone was located in the right proximal ureter and using a 200 micron laser fiber they were fragmented into sub-200 micron size pieces for easy passage. Repeat nephroscopy and ureteroscopy demonstrated no further stone fragments. In addition, there were no papillary lesions within the kidney, or erythematous patches concerning for malignant disease. With the safety wire in place, the ureteroscope was slowly retracted down the ureter. The entire ureter was surveyed. There was no evidence of stricture, stone disease, ureteral trauma, or papillary lesion. To place the stent a 22 [place] rigid cystoscopy was back loaded over the wire. Under direct visualization the stent was advanced until it was in proper location. The Glidewire was then removed. A curl could be seen in the right renal pelvis under using fluoroscopic vision, and in the bladder under direct visualization. The patients bladder was drained. All instrumentation was removed. A string was left on the stent. The patient was then awakened, extubated, and discharged back to the PACU in good and stable condition. Dr. Marval Apgar was scrubbed and present for the entirety of the surgery. Follow-Up: Patient discharged with flomax, oxybutynin, and oral antibiotics. He can remove the stent using the strings in 5 days on 2021.     Eligio Gallegos MD  Electronically signed on 2021 at 7:30 AM

## 2021-08-12 NOTE — PROGRESS NOTES
Discharge instructions including prescriptions and stent removal reviewed with patient and wife. Both acknowledged understanding.

## 2021-08-12 NOTE — H&P
History and Physical    Patient:  Catherine Gonzales  MRN: [id number]  YOB: 1960    CHIEF COMPLAINT: Right flank pain    HISTORY OF PRESENT ILLNESS:   The patient is a 62 y.o. male who presents with right ureteral stone with right flank pain. Patient has 6 mm right ureteral stone causing right hydronephrosis and severe right flank pain. Here for cystoscopy, right URS/HLL. Past Medical History:    Past Medical History:   Diagnosis Date    Arthritis     hands/ thumbs    History of BPH     Kidney stone     Pain     back/ abd    Snores     denies apnea    Wears glasses     Wellness examination     PCP Ander Johnson MD/ [facility]/ last seen 2021       Past Surgical History:    Past Surgical History:   Procedure Laterality Date    FOOT SURGERY Left     bone spur    HERNIA REPAIR       X 2    KNEE SURGERY Right     torn meniscus    TONSILLECTOMY         Medications Prior to Admission:    Prior to Admission medications    Medication Sig Start Date End Date Taking? Authorizing Provider   ibuprofen (ADVIL;MOTRIN) 600 MG tablet Take 600 mg by mouth every 6 hours as needed for Pain   Yes Historical Provider, MD   HYDROcodone-acetaminophen (NORCO) 5-325 MG per tablet Take 1 tablet by mouth every 6 hours as needed for Pain for up to 5 days.  8/9/21 8/14/21 Yes Lisa Jhaveri MD   tamsulosin (FLOMAX) 0.4 MG capsule Take 1 capsule by mouth daily for 5 doses 8/8/21 8/13/21 Yes rBandin Medina PA-C   levoFLOXacin (LEVAQUIN) 750 MG tablet Take 1 tablet by mouth daily for 5 days 8/8/21 8/13/21 Yes Brandin Medina PA-C       Allergies:  Oxycodone-acetaminophen    Social History:    Social History     Socioeconomic History    Marital status:      Spouse name: Not on file    Number of children: Not on file    Years of education: Not on file    Highest education level: Not on file   Occupational History    Not on file   Tobacco Use    Smoking status: Never Smoker    Smokeless tobacco: Never Used   Vaping Use    Vaping Use: Some days    Substances: THC   Substance and Sexual Activity    Alcohol use: Yes     Comment: 2-3 glasses wine daily    Drug use: Yes     Types: Marijuana     Comment: last use July    Sexual activity: Not on file   Other Topics Concern    Not on file   Social History Narrative    Not on file     Social Determinants of Health     Financial Resource Strain:     Difficulty of Paying Living Expenses:    Food Insecurity:     Worried About Running Out of Food in the Last Year:     [address] in the Last Year:    Transportation Needs:     Lack of Transportation (Medical):  Lack of Transportation (Non-Medical):    Physical Activity:     Days of Exercise per Week:     Minutes of Exercise per Session:    Stress:     Feeling of Stress :    Social Connections:     Frequency of Communication with Friends and Family:     Frequency of Social Gatherings with Friends and Family:     Attends Worship Services:     Active Member of Clubs or Organizations:     Attends Club or Organization Meetings:     Marital Status:    Intimate Partner Violence:     Fear of Current or Ex-Partner:     Emotionally Abused:     Physically Abused:     Sexually Abused:        Family History:    Family History   Problem Relation Age of Onset    Diabetes Father        REVIEW OF SYSTEMS:  Constitutional: negative  Eyes: negative  Respiratory: negative  Cardiovascular: negative  Gastrointestinal: negative  Genitourinary: see HPI  Musculoskeletal: negative  Skin: negative         Physical Exam:      Patient Vitals for the past 24 hrs:   BP Temp src Pulse Resp SpO2 Height Weight   08/12/21 0710 (!) 148/98 Temporal 70 10 96 % 6' 4\" (1.93 m) 235 lb (106.6 kg)     Constitutional: Patient in no acute distress; Neuro: alert and oriented to person place and time. Psych: Mood and affect normal.  Lungs: Respiratory effort normal  Cardiovascular:  Normal peripheral pulses. Regular rate.   Abdomen: Soft, non-tender, non-distended        LABS:   No results for input(s): WBC, HGB, HCT, MCV, PLT in the last 72 hours. No results for input(s): NA, K, CL, CO2, PHOS, BUN, CREATININE in the last 72 hours. Invalid input(s): CA  No results found for: PSA    Additional Lab/culture results:    Urinalysis: No results for input(s): COLORU, PHUR, LABCAST, WBCUA, RBCUA, MUCUS, TRICHOMONAS, YEAST, BACTERIA, CLARITYU, SPECGRAV, LEUKOCYTESUR, UROBILINOGEN, Cle[place] Lorie in the last 72 hours. Invalid input(s): NITRATE, GLUCOSEUKETONESUAMORPHOUS     -----------------------------------------------------------------  Imaging Results:    Assessment and Plan   Impression:    62 y.o. male with:  Right flank pain  Right hydronephrosis 2/2 right obstructing 6mm proximal ureteral stone    Plan:   OR today for cystoscopy, right URS/HLL.     Rosi Burch MD  7:17 AM 8/12/2021

## 2021-08-12 NOTE — ANESTHESIA PRE PROCEDURE
Department of Anesthesiology  Preprocedure Note       Name:  Rosa Toussaint   Age:  62 y.o.  :  1960                                          MRN:  [id number]         Date:  2021      Surgeon: Leandro Odell):  Tracy Armijo MD    Procedure: Procedure(s):  HOLMIUM- CYSTO, URETEROSCOPY, LASER LITHO, STENT PLACEMENT    Medications prior to admission:   Prior to Admission medications    Medication Sig Start Date End Date Taking? Authorizing Provider   ibuprofen (ADVIL;MOTRIN) 600 MG tablet Take 600 mg by mouth every 6 hours as needed for Pain   Yes Historical Provider, MD   HYDROcodone-acetaminophen (NORCO) 5-325 MG per tablet Take 1 tablet by mouth every 6 hours as needed for Pain for up to 5 days. 21 Yes Tracy Armijo MD   tamsulosin (FLOMAX) 0.4 MG capsule Take 1 capsule by mouth daily for 5 doses 21 Yes Brandin Medina PA-C   levoFLOXacin (LEVAQUIN) 750 MG tablet Take 1 tablet by mouth daily for 5 days 21 Yes Brandin Medina PA-C       Current medications:    No current facility-administered medications for this encounter. Allergies: Allergies   Allergen Reactions    Oxycodone-Acetaminophen Other (See Comments)     Bad dreams       Problem List:  There is no problem list on file for this patient.       Past Medical History:        Diagnosis Date    Arthritis     hands/ thumbs    History of BPH     Kidney stone     Pain     back/ abd    Snores     denies apnea    Wears glasses     Wellness examination     PCP Blayne Antunez MD/ [facility]/ last seen        Past Surgical History:        Procedure Laterality Date    FOOT SURGERY Left     bone spur    HERNIA REPAIR       X 2    KNEE SURGERY Right     torn meniscus    TONSILLECTOMY         Social History:    Social History     Tobacco Use    Smoking status: Never Smoker    Smokeless tobacco: Never Used   Substance Use Topics    Alcohol use: Yes     Comment: 2-3 glasses wine daily Counseling given: Not Answered      Vital Signs (Current):   Vitals:    08/10/21 0925   Weight: 235 lb (106.6 kg)   Height: 6' 4\" (1.93 m)                                              BP Readings from Last 3 Encounters:   08/09/21 124/78   08/08/21 (!) 152/97       NPO Status:                                                                                 BMI:   Wt Readings from Last 3 Encounters:   08/10/21 235 lb (106.6 kg)   08/09/21 235 lb (106.6 kg)   08/08/21 235 lb (106.6 kg)     Body mass index is 28.61 kg/m². CBC:   Lab Results   Component Value Date    WBC 9.3 08/08/2021    RBC 4.39 08/08/2021    HGB 13.6 08/08/2021    HCT 40.6 08/08/2021    MCV 92.5 08/08/2021    RDW 12.9 08/08/2021     08/08/2021       CMP:   Lab Results   Component Value Date     08/08/2021    K 3.8 08/08/2021    CL 89 08/08/2021    CO2 26 08/08/2021    BUN 14 08/08/2021    CREATININE 1.04 08/08/2021    GFRAA >60 08/08/2021    LABGLOM >60 08/08/2021    GLUCOSE 148 08/08/2021    PROT 7.0 08/08/2021    CALCIUM 9.2 08/08/2021    BILITOT 0.82 08/08/2021    ALKPHOS 60 08/08/2021    AST 17 08/08/2021    ALT 26 08/08/2021       POC Tests: No results for input(s): POCGLU, POCNA, POCK, POCCL, POCBUN, POCHEMO, POCHCT in the last 72 hours.     Coags: No results found for: PROTIME, INR, APTT    HCG (If Applicable): No results found for: PREGTESTUR, PREGSERUM, HCG, HCGQUANT     ABGs: No results found for: PHART, PO2ART, TTV7XBM, PCR8CXC, BEART, Z9LEOWAQ     Type & Screen (If Applicable):  No results found for: LABABO, LABRH    Drug/Infectious Status (If Applicable):  No results found for: HIV, HEPCAB    COVID-19 Screening (If Applicable): No results found for: COVID19        Anesthesia Evaluation  Patient summary reviewed no history of anesthetic complications:   Airway: Mallampati: II        Dental:          Pulmonary:Negative Pulmonary ROS and normal exam  breath sounds clear to auscultation Cardiovascular:Negative CV ROS  Exercise tolerance: good (>4 METS),           Rhythm: regular  Rate: normal                    Neuro/Psych:   Negative Neuro/Psych ROS              GI/Hepatic/Renal:   (+) renal disease: kidney stones,           Endo/Other: Negative Endo/Other ROS                    Abdominal:             Vascular: negative vascular ROS. Other Findings:             Anesthesia Plan      general     ASA 1       Induction: intravenous. MIPS: Postoperative opioids intended, Prophylactic antiemetics administered and Postoperative trial extubation. Anesthetic plan and risks discussed with patient. Plan discussed with CRNA.                   Jessica Hoffmann MD   8/12/2021

## 2021-11-03 ENCOUNTER — TELEPHONE (OUTPATIENT)
Dept: UROLOGY | Age: 61
End: 2021-11-03

## 2021-11-03 NOTE — TELEPHONE ENCOUNTER
Left voicemail for patient to call to schedule follow up office visit w/ KAREN w/ Dr. Jennifer Bourne

## 2021-11-05 NOTE — TELEPHONE ENCOUNTER
Returned patient's voicemail.  Left voicemail for patient to call back to schedule SABINE w/ KAREN w/ Dr. Julio Cesar Jimenez as soon as possible

## 2021-11-10 ENCOUNTER — TELEPHONE (OUTPATIENT)
Dept: UROLOGY | Age: 61
End: 2021-11-10

## 2021-11-10 NOTE — TELEPHONE ENCOUNTER
Satish Brennan and spoke to patient to schedule a f/u-patient stated that he isnt sure if he should be seen as he recently had hernia sx, writer advised I would speak with MA to f/u with Dr. Nirali Stephen, pt asked to send message thru my chart letting him know what Dr. Degroot Mail like him to do.

## 2021-11-17 NOTE — TELEPHONE ENCOUNTER
Pt did call back and said he was doing fine and did not think he needed the apt. Pt also stated he has no issues and is doing terrific. Pt stated he will call us if anything comes up. Call was ended.

## 2022-03-07 ENCOUNTER — OFFICE VISIT (OUTPATIENT)
Dept: UROLOGY | Age: 62
End: 2022-03-07
Payer: COMMERCIAL

## 2022-03-07 VITALS
BODY MASS INDEX: 28.62 KG/M2 | HEART RATE: 79 BPM | HEIGHT: 76 IN | TEMPERATURE: 95.5 F | DIASTOLIC BLOOD PRESSURE: 75 MMHG | RESPIRATION RATE: 17 BRPM | WEIGHT: 235 LBS | SYSTOLIC BLOOD PRESSURE: 120 MMHG

## 2022-03-07 DIAGNOSIS — N20.1 RIGHT URETERAL STONE: Primary | ICD-10-CM

## 2022-03-07 PROCEDURE — 3017F COLORECTAL CA SCREEN DOC REV: CPT | Performed by: UROLOGY

## 2022-03-07 PROCEDURE — 1036F TOBACCO NON-USER: CPT | Performed by: UROLOGY

## 2022-03-07 PROCEDURE — 99213 OFFICE O/P EST LOW 20 MIN: CPT | Performed by: UROLOGY

## 2022-03-07 PROCEDURE — G8427 DOCREV CUR MEDS BY ELIG CLIN: HCPCS | Performed by: UROLOGY

## 2022-03-07 PROCEDURE — G8484 FLU IMMUNIZE NO ADMIN: HCPCS | Performed by: UROLOGY

## 2022-03-07 PROCEDURE — G8419 CALC BMI OUT NRM PARAM NOF/U: HCPCS | Performed by: UROLOGY

## 2022-03-07 ASSESSMENT — ENCOUNTER SYMPTOMS
CONSTIPATION: 0
ABDOMINAL PAIN: 0
VOMITING: 0
DIARRHEA: 0
SHORTNESS OF BREATH: 0
EYE PAIN: 0
NAUSEA: 0
WHEEZING: 0
BACK PAIN: 0
COUGH: 0

## 2022-03-07 NOTE — PROGRESS NOTES
[address] [facility] [address]  Dept: [address] [facility] Urology Office Note - Established    Patient:  Jolie Antony  YOB: 1960  Date: 3/7/2022    The patient is a 62 y.o. male who presents todayfor evaluation of the following problems:   Chief Complaint   Patient presents with    Follow-up     3 month follow up       HPI  Is a very pleasant 60-year-old gentleman who has a history of stones. He had stone surgery last year. He is doing great. He has not had any recent imaging. Summary of old records: N/A    Additional History: N/A    Procedures Today: N/A    Urinalysis today:  No results found for this visit on 03/07/22. Last several PSA's:  No results found for: PSA  Last total testosterone:  No results found for: TESTOSTERONE    AUA Symptom Score (3/7/2022):   INCOMPLETE EMPTYING: How often have you had the sensation of not emptying your bladder?: Not at all  FREQUENCY: How often do you have to urinate less than every two hours?: Not at all  INTERMITTENCY: How often have you found you stopped and started again several times when you urinated?: Not at all  URGENCY: How often have you found it difficult to postpone urination?: Not at all  WEAK STREAM: How often have you had a weak urinary stream?: Not at all  STRAINING: How often have you had to strain to start  urination?: Not at all  NOCTURIA: How many times did you typically get up at night to uriniate?: 2 Times  TOTAL I-PSS SCORE[de-identified] 2       Last BUN and creatinine:  Lab Results   Component Value Date    BUN 14 08/08/2021     Lab Results   Component Value Date    CREATININE 1.04 08/08/2021       Additional Lab/Culture results: none    Imaging Reviewed during this Office Visit: none  (results were independently reviewed by physician and radiology report verified)    PAST MEDICAL, FAMILY AND SOCIAL HISTORY UPDATE:  Past Medical History:   Diagnosis Date    Arthritis     hands/ thumbs    History of BPH     Kidney stone     Pain     back/ abd    Snores     denies apnea    Wears glasses     Wellness examination     PCP Yolanda Zuluaga MD/ [facility]/ last seen 2021     Past Surgical History:   Procedure Laterality Date    FOOT SURGERY Left     bone spur    HERNIA REPAIR       X 2    KNEE SURGERY Right     torn meniscus    TONSILLECTOMY      URETER SURGERY Right 8/12/2021    HOLMIUM- CYSTO, URETEROSCOPY, LASER LITHO, STENT PLACEMENT performed by Tracee Alcantara MD at [facility] URETEROSCOPY Right 08/12/2021    laser litho, stent placement, cystoscopy. Family History   Problem Relation Age of Onset    Diabetes Father      Outpatient Medications Marked as Taking for the 3/7/22 encounter (Office Visit) with Tracee Alcantara MD   Medication Sig Dispense Refill    oxybutynin (DITROPAN XL) 5 MG extended release tablet Take 1 tablet by mouth daily 5 tablet 0    ibuprofen (ADVIL;MOTRIN) 600 MG tablet Take 600 mg by mouth every 6 hours as needed for Pain         Oxycodone-acetaminophen  Social History     Tobacco Use   Smoking Status Never Smoker   Smokeless Tobacco Never Used     (Ifpatient a smoker, smoking cessation counseling offered)    Social History     Substance and Sexual Activity   Alcohol Use Yes    Comment: 2-3 glasses wine daily       REVIEW OF SYSTEMS:  Review of Systems    Physical Exam:      Vitals:    03/07/22 0910   BP: 120/75   Pulse: 79   Resp: 17   Temp: 95.5 °F (35.3 °C)     Body mass index is 28.61 kg/m². Patient is a 62 y.o. male in no acute distress and alert and oriented to person, place and time. Physical Exam  Constitutional: Patient in no acute distress. Neuro: Alert and oriented to person, place and time. Psych: Mood normal, affect normal      Assessment and Plan      1.  Right ureteral stone           Plan:   Renal u/s - pt to call for results (had stone surgery last summer with no f/u)  F/u one year kub      Return in about 1 year (around 3/7/2023) for kub in one year , renal u/s now (pt to call for reuslts and ok to give on phne). Prescriptions Ordered:  No orders of the defined types were placed in this encounter. Orders Placed:  Orders Placed This Encounter   Procedures    XR ABDOMEN (KUB) (SINGLE AP VIEW)     Standing Status:   Future     Standing Expiration Date:   9/7/2023     Order Specific Question:   Reason for exam:     Answer:   kidney stone           Salina Mackey MD    Agree with the ROS entered by the MA.

## 2023-09-27 ENCOUNTER — NURSE TRIAGE (OUTPATIENT)
Dept: OTHER | Facility: CLINIC | Age: 63
End: 2023-09-27

## 2024-04-08 ENCOUNTER — HOSPITAL ENCOUNTER (OUTPATIENT)
Dept: PREADMISSION TESTING | Age: 64
Discharge: HOME | End: 2024-04-12

## 2024-04-08 VITALS — WEIGHT: 228 LBS | HEIGHT: 76 IN | BODY MASS INDEX: 27.76 KG/M2

## 2024-04-08 RX ORDER — DIPHENHYDRAMINE HCL 25 MG
25 CAPSULE ORAL EVERY 6 HOURS PRN
COMMUNITY

## 2024-04-08 RX ORDER — METAXALONE 800 MG/1
800 TABLET ORAL 3 TIMES DAILY PRN
COMMUNITY
Start: 2023-09-21

## 2024-04-08 NOTE — PROGRESS NOTES
Pre-op Instructions For Out-Patient Eye Surgery    Medication Instructions:  Please stop herbs and any supplements now (includes vitamins and minerals).    Please contact your surgeon and prescribing physician for pre-op instructions for any blood thinners.    If you have inhalers/aerosol treatments at home, please use them the morning of your surgery and bring the inhalers with you to the hospital.    Please take the following medications the morning of your surgery with a sip of water:    none    Surgery Instructions:  After midnight before surgery:  Do not eat or drink anything, including water, mints, gum, and hard candy.  You may brush your teeth without swallowing.  No smoking, chewing tobacco, or street drugs.    Please shower or bathe before surgery.       Please do not wear any cologne, lotion, powder, jewelry, piercings, perfume, makeup, nail polish, hair accessories, or hair spray on the day of surgery.  Wear loose comfortable clothing.    Leave your valuables at home but bring a payment source for any after-surgery prescriptions you plan to fill at [facility].  Bring a storage case for any glasses/contacts.    An adult who is responsible for you MUST drive you home and should be with you for the first 24 hours after surgery.     The Day of Surgery:  Arrive at [facility] Surgery Entrance at the time directed by your surgeon and check in at the desk.     If you have a living will or healthcare power of , please bring a copy.    You will be taken to the pre-op holding area where you will be prepared for surgery.  A physical assessment will be performed by a nurse practitioner or house officer.  Your IV will be started and you will meet your anesthesiologist.    When you go to surgery, your family will be directed to the surgical waiting room, where the doctor should speak with them after your surgery.    After surgery, you will be taken to the recovery area.  When you are

## 2024-04-19 ENCOUNTER — ANESTHESIA EVENT (OUTPATIENT)
Dept: OPERATING ROOM | Age: 64
End: 2024-04-19
Payer: COMMERCIAL

## 2024-04-19 RX ORDER — LIDOCAINE HYDROCHLORIDE 10 MG/ML
1 INJECTION, SOLUTION EPIDURAL; INFILTRATION; INTRACAUDAL; PERINEURAL
Status: CANCELLED | OUTPATIENT
Start: 2024-04-19 | End: 2024-04-20

## 2024-04-19 RX ORDER — SODIUM CHLORIDE 0.9 % (FLUSH) 0.9 %
5-40 SYRINGE (ML) INJECTION PRN
Status: CANCELLED | OUTPATIENT
Start: 2024-04-19

## 2024-04-19 RX ORDER — SODIUM CHLORIDE 0.9 % (FLUSH) 0.9 %
5-40 SYRINGE (ML) INJECTION EVERY 12 HOURS SCHEDULED
Status: CANCELLED | OUTPATIENT
Start: 2024-04-19

## 2024-04-19 RX ORDER — SODIUM CHLORIDE 9 MG/ML
INJECTION, SOLUTION INTRAVENOUS PRN
Status: CANCELLED | OUTPATIENT
Start: 2024-04-19

## 2024-04-22 ENCOUNTER — HOSPITAL ENCOUNTER (OUTPATIENT)
Age: 64
Setting detail: OUTPATIENT SURGERY
Discharge: HOME | End: 2024-04-22
Attending: OPHTHALMOLOGY | Admitting: OPHTHALMOLOGY
Payer: COMMERCIAL

## 2024-04-22 ENCOUNTER — ANESTHESIA (OUTPATIENT)
Dept: OPERATING ROOM | Age: 64
End: 2024-04-22
Payer: COMMERCIAL

## 2024-04-22 VITALS
RESPIRATION RATE: 14 BRPM | OXYGEN SATURATION: 96 % | DIASTOLIC BLOOD PRESSURE: 84 MMHG | TEMPERATURE: 97.1 F | BODY MASS INDEX: 27.39 KG/M2 | WEIGHT: 225 LBS | SYSTOLIC BLOOD PRESSURE: 145 MMHG | HEART RATE: 67 BPM

## 2024-04-22 PROCEDURE — 2500000003 HC RX 250 WO HCPCS: Performed by: OPHTHALMOLOGY

## 2024-04-22 PROCEDURE — 3700000001 HC ADD 15 MINUTES (ANESTHESIA): Performed by: OPHTHALMOLOGY

## 2024-04-22 PROCEDURE — 7100000010 HC PHASE II RECOVERY - FIRST 15 MIN: Performed by: OPHTHALMOLOGY

## 2024-04-22 PROCEDURE — 2580000003 HC RX 258: Performed by: OPHTHALMOLOGY

## 2024-04-22 PROCEDURE — 2709999900 HC NON-CHARGEABLE SUPPLY: Performed by: OPHTHALMOLOGY

## 2024-04-22 PROCEDURE — 3700000000 HC ANESTHESIA ATTENDED CARE: Performed by: OPHTHALMOLOGY

## 2024-04-22 PROCEDURE — 3600000012 HC SURGERY LEVEL 2 ADDTL 15MIN: Performed by: OPHTHALMOLOGY

## 2024-04-22 PROCEDURE — 6370000000 HC RX 637 (ALT 250 FOR IP): Performed by: OPHTHALMOLOGY

## 2024-04-22 PROCEDURE — V2632 POST CHMBR INTRAOCULAR LENS: HCPCS | Performed by: OPHTHALMOLOGY

## 2024-04-22 PROCEDURE — 3600000002 HC SURGERY LEVEL 2 BASE: Performed by: OPHTHALMOLOGY

## 2024-04-22 PROCEDURE — 6360000002 HC RX W HCPCS: Performed by: OPHTHALMOLOGY

## 2024-04-22 DEVICE — LENS CLAREON IOL 19.0D: Type: IMPLANTABLE DEVICE | Site: EYE | Status: FUNCTIONAL

## 2024-04-22 RX ORDER — SODIUM CHLORIDE 0.9 % (FLUSH) 0.9 %
5-40 SYRINGE (ML) INJECTION EVERY 12 HOURS SCHEDULED
Status: DISCONTINUED | OUTPATIENT
Start: 2024-04-22 | End: 2024-04-22 | Stop reason: HOSPADM

## 2024-04-22 RX ORDER — CIPROFLOXACIN HYDROCHLORIDE 3.5 MG/ML
SOLUTION/ DROPS TOPICAL PRN
Status: DISCONTINUED | OUTPATIENT
Start: 2024-04-22 | End: 2024-04-22 | Stop reason: ALTCHOICE

## 2024-04-22 RX ORDER — EPINEPHRINE 1 MG/ML
INJECTION, SOLUTION, CONCENTRATE INTRAVENOUS PRN
Status: DISCONTINUED | OUTPATIENT
Start: 2024-04-22 | End: 2024-04-22 | Stop reason: ALTCHOICE

## 2024-04-22 RX ORDER — LIDOCAINE HYDROCHLORIDE 10 MG/ML
INJECTION, SOLUTION EPIDURAL; INFILTRATION; INTRACAUDAL; PERINEURAL PRN
Status: DISCONTINUED | OUTPATIENT
Start: 2024-04-22 | End: 2024-04-22 | Stop reason: ALTCHOICE

## 2024-04-22 RX ORDER — SODIUM CHLORIDE 0.9 % (FLUSH) 0.9 %
5-40 SYRINGE (ML) INJECTION PRN
Status: DISCONTINUED | OUTPATIENT
Start: 2024-04-22 | End: 2024-04-22 | Stop reason: HOSPADM

## 2024-04-22 RX ORDER — PREDNISOLONE ACETATE 10 MG/ML
SUSPENSION/ DROPS OPHTHALMIC PRN
Status: DISCONTINUED | OUTPATIENT
Start: 2024-04-22 | End: 2024-04-22 | Stop reason: ALTCHOICE

## 2024-04-22 RX ORDER — PROPARACAINE HYDROCHLORIDE 5 MG/ML
1 SOLUTION/ DROPS OPHTHALMIC EVERY 5 MIN PRN
Status: COMPLETED | OUTPATIENT
Start: 2024-04-22 | End: 2024-04-22

## 2024-04-22 RX ORDER — TETRACAINE HYDROCHLORIDE 5 MG/ML
SOLUTION OPHTHALMIC PRN
Status: DISCONTINUED | OUTPATIENT
Start: 2024-04-22 | End: 2024-04-22 | Stop reason: ALTCHOICE

## 2024-04-22 RX ORDER — KETOROLAC TROMETHAMINE 5 MG/ML
1 SOLUTION OPHTHALMIC
Status: COMPLETED | OUTPATIENT
Start: 2024-04-22 | End: 2024-04-22

## 2024-04-22 RX ORDER — CYCLOPENTOLATE HYDROCHLORIDE 10 MG/ML
1 SOLUTION/ DROPS OPHTHALMIC PRN
Status: COMPLETED | OUTPATIENT
Start: 2024-04-22 | End: 2024-04-22

## 2024-04-22 RX ORDER — SODIUM CHLORIDE 9 MG/ML
INJECTION, SOLUTION INTRAVENOUS CONTINUOUS
Status: DISCONTINUED | OUTPATIENT
Start: 2024-04-22 | End: 2024-04-22 | Stop reason: HOSPADM

## 2024-04-22 RX ORDER — SODIUM CHLORIDE 9 MG/ML
INJECTION, SOLUTION INTRAVENOUS PRN
Status: DISCONTINUED | OUTPATIENT
Start: 2024-04-22 | End: 2024-04-22 | Stop reason: HOSPADM

## 2024-04-22 RX ADMIN — Medication 1 DROP: at 06:23

## 2024-04-22 RX ADMIN — KETOROLAC TROMETHAMINE 1 DROP: 0.5 SOLUTION OPHTHALMIC at 06:18

## 2024-04-22 RX ADMIN — CYCLOPENTOLATE HYDROCHLORIDE 1 DROP: 10 SOLUTION/ DROPS OPHTHALMIC at 06:19

## 2024-04-22 RX ADMIN — Medication 1 DROP: at 06:18

## 2024-04-22 RX ADMIN — Medication 1 DROP: at 06:28

## 2024-04-22 RX ADMIN — Medication 5 ML: at 07:26

## 2024-04-22 RX ADMIN — CYCLOPENTOLATE HYDROCHLORIDE 1 DROP: 10 SOLUTION/ DROPS OPHTHALMIC at 06:28

## 2024-04-22 RX ADMIN — CYCLOPENTOLATE HYDROCHLORIDE 1 DROP: 10 SOLUTION/ DROPS OPHTHALMIC at 06:24

## 2024-04-22 ASSESSMENT — ENCOUNTER SYMPTOMS
SORE THROAT: 0
COUGH: 0
DIARRHEA: 0
ABDOMINAL PAIN: 0
SHORTNESS OF BREATH: 0
NAUSEA: 0
BACK PAIN: 1
VOMITING: 0
CONSTIPATION: 1

## 2024-04-22 ASSESSMENT — PAIN - FUNCTIONAL ASSESSMENT
PAIN_FUNCTIONAL_ASSESSMENT: 0-10
PAIN_FUNCTIONAL_ASSESSMENT: NONE - DENIES PAIN

## 2024-04-22 NOTE — ANESTHESIA PRE PROCEDURE
Department of Anesthesiology  Preprocedure Note       Name:  Jose Barajas   Age:  63 y.o.  :  1960                                          MRN:  [id number]         Date:  2024      Surgeon: Surgeon(s):  Josef Carter MD    Procedure: Procedure(s):  EYE CATARACT EMULSIFICATION INTRAOCULAR LENS IMPLANT RIGHT    Medications prior to admission:   Prior to Admission medications    Medication Sig Start Date End Date Taking? Authorizing Provider   Inulin (FIBER CHOICE PO) Take by mouth    Osvaldo Dickinson MD   Multiple Vitamin (MULTI VITAMIN DAILY PO) Take 1 tablet by mouth 3 times daily    Osvaldo Dickinson MD   metaxalone (SKELAXIN) 800 MG tablet Take 1 tablet by mouth 3 times daily as needed 23   Osvaldo Dickinson MD   aluminum chloride (DRYSOL) 20 % external solution Apply 1 Application topically daily as needed  Patient not taking: Reported on 2024 4/10/23   Osvaldo Dickinson MD   diphenhydrAMINE (BENADRYL) 25 MG capsule Take 1 capsule by mouth every 6 hours as needed for Allergies    ProviderOsvaldo MD   ibuprofen (ADVIL;MOTRIN) 600 MG tablet Take 1 tablet by mouth every 6 hours as needed for Pain    ProviderOsvaldo MD       Current medications:    Current Facility-Administered Medications   Medication Dose Route Frequency Provider Last Rate Last Admin   • 0.9 % sodium chloride infusion   IntraVENous Continuous Josef Carter MD       • sodium chloride flush 0.9 % injection 5-40 mL  5-40 mL IntraVENous 2 times per day Josef Carter MD       • sodium chloride flush 0.9 % injection 5-40 mL  5-40 mL IntraVENous PRN Josef Carter MD       • 0.9 % sodium chloride infusion   IntraVENous PRN Josef Carter MD           Allergies:    Allergies   Allergen Reactions   • Oxycodone-Acetaminophen Other (See Comments)     Bad dreams       Problem List:  There is no problem list on file for this patient.      Past Medical History:        Diagnosis Date   •

## 2024-04-22 NOTE — OP NOTE
Operative Note      Patient: Jose Barajas  YOB: 1960  MRN: [id number]    Date of Procedure: 4/22/2024    Pre-Op Diagnosis Codes:     * Nuclear sclerotic cataract of right eye [H25.11]    Post-Op Diagnosis: Same       Procedure(s):  EYE CATARACT EMULSIFICATION INTRAOCULAR LENS IMPLANT RIGHT    Surgeon(s):  Josef Carter MD    Assistant:   * No surgical staff found *    Anesthesia: Monitor Anesthesia Care    Estimated Blood Loss (mL): Minimal    Complications: None    Specimens:   * No specimens in log *    Implants:  Implant Name Type Inv. Item Serial No.  Lot No. LRB No. 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  He appears understand the risk benefits alternatives of cataract surgery and wishes to proceed.The patient was brought back into the operating suite, placed in supine position, prepped and draped in usual standard fashion. A lid speculum was placed into the right eye. Approximately 0.3 cc of plain nonpreserved lidocaine was placed onto the eye topically. A paracentesis tract was made at the 2 o'clock position with an eye knife. Then, 0.3 cc of 1% non-preserved lidocaine was placed into the anterior chamber.  Epinephrine 1-1000 plain by sulfate and preservative-free was diluted to 2-10,000 with BSS and approximately 0.5 cc given intracameral.  Trypan blue staining was used.

## 2024-04-22 NOTE — ANESTHESIA POSTPROCEDURE EVALUATION
Department of Anesthesiology  Postprocedure Note    Patient: Jose Barajas  MRN: [id number]  YOB: 1960  Date of evaluation: 4/22/2024    Procedure Summary       Date: 04/22/24 Room / Location: [address]    Anesthesia Start: 0724 Anesthesia Stop: 0756    Procedure: EYE CATARACT EMULSIFICATION INTRAOCULAR LENS IMPLANT RIGHT (Right: Eye) Diagnosis:       Nuclear sclerotic cataract of right eye      (Nuclear sclerotic cataract of right eye [H25.11])    Surgeons: Josef Carter MD Responsible Provider: Breanna London MD    Anesthesia Type: MAC ASA Status: 2            Anesthesia Type: No value filed.    Loren Phase I: Loren Score: 10    Loren Phase II: Loren Score: 10    Anesthesia Post Evaluation    Comments: POST- ANESTHESIA EVALUATION       Pt Name: Jose Barajas  MRN: [id number]  YOB: 1960  Date of evaluation: 4/22/2024  Time:  8:34 AM      BP (!) 145/84   Pulse 67   Temp 97.1 °F (36.2 °C) (Infrared)   Resp 14   Wt 102.1 kg (225 lb)   SpO2 96%   BMI 27.39 kg/m²      Consciousness Level  Awake  Cardiopulmonary Status  Stable  Pain Adequately Treated YES  Nausea / Vomiting  NO  Adequate Hydration  YES  Anesthesia Related Complications NONE      Electronically signed by Breanna London MD on 4/22/2024 at 8:34 AM      No notable events documented.

## 2024-04-22 NOTE — H&P
HISTORY and PHYSICAL  [facility]       NAME:  Jose Barajas  MRN: [id number]   YOB: 1960   Date: 4/22/2024   Age: 63 y.o.  Gender: male       COMPLAINT AND PRESENT HISTORY:     Jose Barajas is 63 y.o.  male, here for     Procedure(s):  EYE CATARACT EMULSIFICATION INTRAOCULAR LENS IMPLANT RIGHT    Pre-Op Diagnosis Codes:     * Nuclear sclerotic cataract of right eye [H25.11]    Has not had previous eye surgery.        Using eye gtts as prescribed.    Has right eye \"ghosting\". Reports constant redness to both eyes he attributes to allergies.     Denies eye pain, itching or drainage     Wears corrective lens.     NPO p MN. Took no oral medications this am. Last dose of ibuprofen. Denies taking any other blood thinning medications. Denies recent or current chest pain/pressure, palpitations, SOB, recent URI, fever or chills.       RECENT LABS, IMAGING AND TESTING     Contains abnormal data Comprehensive metabolic panel  Order: [id number]  Component  Ref Range & Units 4/5/23 0858   Sodium  134 - 146 mmol/L 138   Potassium, Bld  3.5 - 5.0 mmol/L 4.3   Chloride  98 - 109 mmol/L 100   CO2  22 - 32 mmol/L 29   Anion gap  5 - 15 mmol/L 9   BUN  5 - 27 mg/dL 17   Creatinine  0.60 - 1.30 mg/dL 0.82   Comment: METHOD TRACEABLE TO IDMS STANDARD   Glucose  65 - 99 mg/dL 111 High    Calcium  8.5 - 10.5 mg/dL 9.5   Total Protein  6.0 - 8.0 g/dL 6.8   Albumin  3.2 - 5.3 g/dL 4.4   Alkaline Phosphatase  39 - 130 U/L 43   AST  0 - 41 U/L 20   ALT  0 - 40 U/L 33   Total bilirubin  0.3 - 1.2 mg/dL 0.9   eGFR (CKD-EPI)non-race dependent  >59 ml/min/1.73sq.m >90   Comment:       Reported eGFR is based on the  CKD-EPI 2021 equation that does  not use a race coefficient.         Resulting Agency [facility]     Specimen Collected: 04/05/23 08:58    Performed by: [employer] Last Resulted: 04/05/23 12:11   Received From: [employer]  Result Received: 04/08/24 12:44       CBC auto

## 2025-08-07 ENCOUNTER — HOSPITAL ENCOUNTER (OUTPATIENT)
Dept: PHYSICAL THERAPY | Facility: CLINIC | Age: 65
Setting detail: THERAPIES SERIES
Discharge: HOME | End: 2025-08-07
Payer: COMMERCIAL

## 2025-08-07 PROCEDURE — 97161 PT EVAL LOW COMPLEX 20 MIN: CPT

## 2025-08-11 ENCOUNTER — HOSPITAL ENCOUNTER (OUTPATIENT)
Dept: PHYSICAL THERAPY | Facility: CLINIC | Age: 65
Setting detail: THERAPIES SERIES
Discharge: HOME | End: 2025-08-11
Payer: COMMERCIAL

## 2025-08-11 PROCEDURE — 97113 AQUATIC THERAPY/EXERCISES: CPT

## 2025-08-14 ENCOUNTER — HOSPITAL ENCOUNTER (OUTPATIENT)
Dept: PHYSICAL THERAPY | Facility: CLINIC | Age: 65
Setting detail: THERAPIES SERIES
Discharge: HOME | End: 2025-08-14
Payer: COMMERCIAL

## 2025-08-14 PROCEDURE — 97113 AQUATIC THERAPY/EXERCISES: CPT

## 2025-08-18 ENCOUNTER — HOSPITAL ENCOUNTER (OUTPATIENT)
Dept: PHYSICAL THERAPY | Facility: CLINIC | Age: 65
Setting detail: THERAPIES SERIES
Discharge: HOME | End: 2025-08-18
Payer: COMMERCIAL

## 2025-08-18 PROCEDURE — 97113 AQUATIC THERAPY/EXERCISES: CPT

## 2025-08-21 ENCOUNTER — HOSPITAL ENCOUNTER (OUTPATIENT)
Dept: PHYSICAL THERAPY | Facility: CLINIC | Age: 65
Setting detail: THERAPIES SERIES
Discharge: HOME | End: 2025-08-21
Payer: COMMERCIAL

## 2025-08-21 PROCEDURE — 97113 AQUATIC THERAPY/EXERCISES: CPT

## 2025-08-25 ENCOUNTER — APPOINTMENT (OUTPATIENT)
Dept: PHYSICAL THERAPY | Facility: CLINIC | Age: 65
End: 2025-08-25
Payer: COMMERCIAL

## 2025-08-28 ENCOUNTER — HOSPITAL ENCOUNTER (OUTPATIENT)
Dept: PHYSICAL THERAPY | Facility: CLINIC | Age: 65
Setting detail: THERAPIES SERIES
Discharge: HOME | End: 2025-08-28
Payer: COMMERCIAL

## 2025-08-28 PROCEDURE — 97113 AQUATIC THERAPY/EXERCISES: CPT

## (undated) DEVICE — MICROSURGICAL INSTRUMENT ANTERIOR CHAMBER CANNULA 27GA: Brand: ALCON

## (undated) DEVICE — PACK PROCEDURE SURG CYSTO SVMMC LF

## (undated) DEVICE — STRIP,CLOSURE,WOUND,MEDI-STRIP,1/2X4: Brand: MEDLINE

## (undated) DEVICE — GUIDEWIRE URO L150CM DIA0.035IN STIFF NIT HYDRPHLC STR TIP

## (undated) DEVICE — GARMENT COMPR STD FOR 17IN CALF UNIF THER FLOTRN

## (undated) DEVICE — SATINCRESCENT® KNIFE ANGLED BEVEL UP: Brand: SATINCRESCENT®

## (undated) DEVICE — TOWEL,OR,DSP,ST,WHITE,DLX,2/PK,40PK/CS: Brand: MEDLINE

## (undated) DEVICE — GLOVE SURG SZ 75 CRM LTX FREE POLYISOPRENE POLYMER BEAD ANTI

## (undated) DEVICE — DUAL LUMEN URETERAL CATHETER

## (undated) DEVICE — CORD,CAUTERY,BIPOLAR,STERILE: Brand: MEDLINE

## (undated) DEVICE — DRAPE,REIN 53X77,STERILE: Brand: MEDLINE

## (undated) DEVICE — GOWN,AURORA,NONREINFORCED,LARGE: Brand: MEDLINE

## (undated) DEVICE — FIBER LASER HOLM DISP SU200RT] LEONI FIBER OPTICS INC]

## (undated) DEVICE — SINGLE ACTION PUMPING SYSTEM

## (undated) DEVICE — GOWN,SIRUS,NONRNF,XLN/XL,20/CS: Brand: MEDLINE

## (undated) DEVICE — 3M™ STERI-STRIP™ COMPOUND BENZOIN TINCTURE 40 BAGS/CARTON 4 CARTONS/CASE C1544: Brand: 3M™ STERI-STRIP™

## (undated) DEVICE — THE MONARCH® "B" CARTRIDGE IS A SINGLE-USE POLYPROPYLENE CARTRIDGE FOR POSTERIOR CHAMBER IOL DELIVERY: Brand: MONARCH® II

## (undated) DEVICE — CLEARCUT® SLIT KNIFE DUAL BEVEL 2.75MM ANGLED: Brand: CLEARCUT®

## (undated) DEVICE — SWABSTICK MEDICATED 10% POVIDONE IOD PVP TRIPE ANTISEP PREP 3 PER PK

## (undated) DEVICE — SYRINGE MEDICAL 3ML CLEAR PLASTIC STANDARD NON CONTROL LUERLOCK TIP DISPOSABLE

## (undated) DEVICE — PACK CATARACT SURGI+CARE CUSTOM

## (undated) DEVICE — PENCIL ELECSURG BPLR 18 GA DISP

## (undated) DEVICE — SOLUTION IRRIGATION BAL SALT SOLUTION 500 ML STRL BSS

## (undated) DEVICE — ADAPTER URO SCP UROLOK LL

## (undated) DEVICE — GLOVE SURG SZ 65 THK91MIL LTX FREE SYN POLYISOPRENE

## (undated) DEVICE — MARKER,SKIN,WI/RULER AND LABELS: Brand: MEDLINE